# Patient Record
Sex: FEMALE | Race: OTHER | NOT HISPANIC OR LATINO
[De-identification: names, ages, dates, MRNs, and addresses within clinical notes are randomized per-mention and may not be internally consistent; named-entity substitution may affect disease eponyms.]

---

## 2021-06-25 ENCOUNTER — APPOINTMENT (OUTPATIENT)
Dept: OTOLARYNGOLOGY | Facility: CLINIC | Age: 61
End: 2021-06-25
Payer: COMMERCIAL

## 2021-06-25 VITALS — BODY MASS INDEX: 33.43 KG/M2 | TEMPERATURE: 96 F | HEIGHT: 66 IN | WEIGHT: 208 LBS

## 2021-06-25 DIAGNOSIS — M26.609 UNSPECIFIED TEMPOROMANDIBULAR JOINT DISORDER: ICD-10-CM

## 2021-06-25 DIAGNOSIS — H93.299 OTHER ABNORMAL AUDITORY PERCEPTIONS, UNSPECIFIED EAR: ICD-10-CM

## 2021-06-25 PROBLEM — Z00.00 ENCOUNTER FOR PREVENTIVE HEALTH EXAMINATION: Status: ACTIVE | Noted: 2021-06-25

## 2021-06-25 PROCEDURE — 99213 OFFICE O/P EST LOW 20 MIN: CPT | Mod: 25

## 2021-06-25 PROCEDURE — 92557 COMPREHENSIVE HEARING TEST: CPT

## 2021-06-25 PROCEDURE — 31231 NASAL ENDOSCOPY DX: CPT

## 2021-06-25 PROCEDURE — 92550 TYMPANOMETRY & REFLEX THRESH: CPT

## 2021-06-25 NOTE — PHYSICAL EXAM
[FreeTextEntry1] : \par The patient was alert and oriented and in no distress.\par Voice was clear.\par \par Face:\par The patient had no facial asymmetry or mass.\par The skin was unremarkable.\par \par Eyes:\par The pupils were equal round and reactive to light and accommodation.\par There was no significant nystagmus or disconjugate gaze noted.\par \par Nose: \par The external nose had no significant deformity.  There was no facial tenderness.  On anterior rhinoscopy, the nasal mucosa was clear.  The anterior septum was midline.  There were no visualized polyps purulence  or masses.\par \par Oral cavity:\par The oral mucosa was normal.\par The oral and base of tongue were clear and without mass.\par The gingival and buccal mucosa were moist and without lesions.\par The palate moved well.\par There was no cleft to the palate.\par There appeared to be good salivary flow.  \par There was no pus, erythema or mass in the oral cavity.\par \par \par Ears:\par The external ears were normal without deformity.\par The ear canals were clear.\par The tympanic membranes were intact and normal.\par \par Neck: \par The neck was symmetrical.\par The parotid and submandibular glands were normal without masses.\par The trachea was midline and there was no unusual crepitus.\par The thyroid was smooth and nontender and no masses were palpated.\par There was no significant cervical adenopathy.\par \par \par Neuro:\par Neurologically, the patient was awake, alert, and oriented to person, place and time. There were no obvious focal neurologic abnormalities.  Cranial nerves II through XII were grossly intact.\par \par \par \par \par \par  [de-identified] : Nasal endoscopy: \par CPT 49110\par Procedure Note:\par \par Endoscopy was done with Covid precautions and with video. All risks and benefits were discussed with the patient and consent obtained.\par \par Nasal endoscopy was done with topical anesthesia of Pontocaine and Afrin and a      nasal endoscope.\par Indication: Nasal congestion, rule out sinusitis.\par Procedure: The nasal cavity was anesthetized with topical Afrin and Pontocaine. An  endoscope was used and inserted into the nasal cavity.\par Attention was first paid to the anterior nasal cavity.\par Endocoscopy was performed to inspect the interior of the nasal cavity, the nasal septum,  the middle and superior meati, the inferior, middle and superior turbinates, and the spheno-ethmoidal  recesses, the nasopharynx and eustachian tube orifices bilaterally. \par All findings were normal except:\par Mucosa is slightly boggy with a moderate S-shaped deflection\par \par Flexible fiberoptic laryngoscopy: CPT 67341\par Indications: Dysphagia\par Procedure note:\par \par Flexible fiberoptic laryngoscopy was performed because of dysphagia and because of the patient's inability to tolerate adequate mirror examination.\par \par The nasal cavity was anesthetized with Pontocaine and Afrin.\par The flexible endoscope was placed into the patient's nasal cavity.\par The nasopharynx was without masses.\par The oropharynx, vallecula and base of tongue had no masses.\par The epiglottis, aryepiglottic folds and false vocal cords were normal.\par The pyriform sinuses were without mucosal lesions or pooling of secretions.  \par The laryngeal ventricles were without lesions.\par The visualized subglottis was without mass.\par The lateral and posterior pharyngeal walls were clear and symmetrical.\par The vocal folds were clear and mobile; they abducted and adducted normally.\par There was no interarytenoid mass or fullness.\par \par Endoscopy was done with Covid precautions and with video. All risks and benefits were discussed with the patient and consent obtained.\par \par There is a punctate hemorrhage in the right mid anterior one third vocal fold\par \par \par TMJ:\par She had bilateral crepitus, mild tenderness and oral signs of bruxism\par \par \par Audiometry:\par \par Comprehensive audiometry showed that both ears had normal hearing.  The tympanograms were type A and the otoacoustic emissions were intact bilaterally.\par The audiogram was reviewed with the patient.

## 2021-06-25 NOTE — REASON FOR VISIT
[Subsequent Evaluation] : a subsequent evaluation for [Ear Pain] : ear pain [FreeTextEntry2] : nasal congestion

## 2021-06-25 NOTE — HISTORY OF PRESENT ILLNESS
[de-identified] : KATHY HERNANDEZ is a 60 year old female who comes in complaining of Intermittent right-sided ear discomfort, worse when laying on her right associated with some right-sided nasal congestion.  She denies significant reflux.She has some left-sided ear congestion as well.

## 2021-06-25 NOTE — REVIEW OF SYSTEMS
[Ear Pain] : ear pain [Nasal Congestion] : nasal congestion [Negative] : Heme/Lymph [de-identified] : pressure [FreeTextEntry4] : neck pain

## 2021-06-25 NOTE — ASSESSMENT
[FreeTextEntry1] : It was my impression that she has a normal ear exam. 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

## 2021-06-25 NOTE — CONSULT LETTER
[FreeTextEntry2] : ELIZABET TELLO\par  [FreeTextEntry1] : \par \par Dear  Dr. HO TELLO,\par \par I had the pleasure of seeing your patient today.  \par Please see my note below.\par \par \par Thank you very much for allowing me to participate in the care of your patient.\par \par Sincerely,\par \par \par Ho Kendrick MD\par [facility]\par [facility]\par  [facility]\par \par

## 2022-10-17 ENCOUNTER — APPOINTMENT (OUTPATIENT)
Dept: OTOLARYNGOLOGY | Facility: CLINIC | Age: 62
End: 2022-10-17

## 2022-10-17 DIAGNOSIS — R07.0 PAIN IN THROAT: ICD-10-CM

## 2022-10-17 PROCEDURE — 99213 OFFICE O/P EST LOW 20 MIN: CPT | Mod: 25

## 2022-10-17 PROCEDURE — 31575 DIAGNOSTIC LARYNGOSCOPY: CPT

## 2022-10-17 NOTE — PROCEDURE
[de-identified] : PROCEDURE: Flexible laryngoscopy\par SURGEON: Dr. Cline\par INDICATIONS: He was unable to tolerate a mirror exam. Assess for laryngopharynx pharyngeal reflux. cough. head and neck mass. \par ANESTHESIA: The patient was placed in a sitting position.  Following application of the topical anesthetic and decongestant, exam was performed with a flexible scope.  The scope was passed along the right nasal floor to the nasopharynx.  It was then passed into the region of the middle meatus, middle turbinate, and sphenoethmoid region.  An identical procedure was performed on the left side.  The following findings were noted:\par \par The nasal musoca was healthy appearing and the septum was roughly midline. The middle meatus and sphenoethmoid recesses were clear bilaterally. The nasopharynx \par \par Nasopharynx: no masses, choanae patent, no adenoid tissue\par \par Base of tongue/vallecula: no masses or asymmetry\par Pharyngeal walls: symmetrical. No masses.\par Pyriform sinuses: no lesions or pooling of secretions.\par Epiglottis: normal. No edema or lesions.\par Aryepiglottic folds: normal. No lesions. \par Vocal cords: clear and mobile. No lesions. Airway patent.\par Arytenoids: no edema or erythema. \par Interarytenoid area: no edema, erythema or lesion.\par

## 2022-10-17 NOTE — HISTORY OF PRESENT ILLNESS
[de-identified] : Initial visit.\par Chief complaint is "lump in neck".\par \par She reports that she was recently at her plastic surgeon for what sounds like resection of scalp sebaceous cyst.  She pointed to a lesion in her left neck and the plastic surgeon suggested she come to an otolaryngologist for evaluation.\par \par She believes the lesion in question has been present for years.\par She does not complain of unexplained weight loss, hemoptysis, dysphagia or any other head neck complaints.

## 2022-10-17 NOTE — ASSESSMENT
[FreeTextEntry1] : Clinically what appears to be cervical lymphadenopathy in her left lateral neck.\par She will be sent for sonogram of her neck and seen in follow-up.

## 2022-10-18 ENCOUNTER — OUTPATIENT (OUTPATIENT)
Dept: OUTPATIENT SERVICES | Facility: HOSPITAL | Age: 62
LOS: 1 days | End: 2022-10-18
Payer: COMMERCIAL

## 2022-10-18 ENCOUNTER — APPOINTMENT (OUTPATIENT)
Dept: ULTRASOUND IMAGING | Facility: HOSPITAL | Age: 62
End: 2022-10-18

## 2022-10-18 PROCEDURE — 76536 US EXAM OF HEAD AND NECK: CPT

## 2022-10-18 PROCEDURE — 76536 US EXAM OF HEAD AND NECK: CPT | Mod: 26

## 2022-12-01 ENCOUNTER — APPOINTMENT (OUTPATIENT)
Dept: OTOLARYNGOLOGY | Facility: CLINIC | Age: 62
End: 2022-12-01

## 2022-12-01 VITALS — BODY MASS INDEX: 36.96 KG/M2 | HEIGHT: 66 IN | WEIGHT: 230 LBS | TEMPERATURE: 96.3 F

## 2022-12-01 DIAGNOSIS — Z80.9 FAMILY HISTORY OF MALIGNANT NEOPLASM, UNSPECIFIED: ICD-10-CM

## 2022-12-01 DIAGNOSIS — R09.82 POSTNASAL DRIP: ICD-10-CM

## 2022-12-01 DIAGNOSIS — G40.909 EPILEPSY, UNSPECIFIED, NOT INTRACTABLE, W/OUT STATUS EPILEPTICUS: ICD-10-CM

## 2022-12-01 DIAGNOSIS — J31.0 CHRONIC RHINITIS: ICD-10-CM

## 2022-12-01 DIAGNOSIS — Z87.891 PERSONAL HISTORY OF NICOTINE DEPENDENCE: ICD-10-CM

## 2022-12-01 PROCEDURE — 99213 OFFICE O/P EST LOW 20 MIN: CPT | Mod: 25

## 2022-12-01 PROCEDURE — 31231 NASAL ENDOSCOPY DX: CPT

## 2022-12-01 NOTE — ASSESSMENT
[FreeTextEntry1] : She has at least a 1 month history of nasal and throat symptoms.  She has had chronic rhinitis and postnasal drip.  Her symptoms did improve over the past week.  On exam, she has mild nasal mucosal edema and a deviated septum.  There is no obvious sinus infection or other abnormalities.\par \par She saw Dr. Cline in October for a possible neck mass.  Ultrasound showed bilateral benign-appearing cervical lymph nodes which were likely reactive.\par \par Plan\par -Findings and management options were discussed with the patient.\par -I recommended nasal saline irrigations, a trial of a nasal steroid spray, and antihistamine or decongestant as needed\par -Salt water gargles as needed.  Avoid alcohol based mouthwashes\par -She was given literature regarding postnasal drip.\par -Although she did not have significant reflux related laryngeal changes, she has had some mild symptoms.  I recommended reflux precautions.  She may also try OTC medication.\par -Since she is feeling better, we will see how she does.  If she has recurrent symptoms, we will discuss further work-up such as allergy evaluation and work-up for reflux.\par -I have asked her to follow-up with Dr. Cline for management of the cervical lymph nodes.\par -She should follow-up if she has persistent symptoms or any concerns

## 2022-12-01 NOTE — HISTORY OF PRESENT ILLNESS
[de-identified] : KATHY HERNANDEZ is a 62 year old patient here for at least a 1 month history of nasal congestion, phlegm in her throat, cough, and postnasal drip. She is well known to Dr. Kendrick.  She has had mild intermittent reflux.  She has no throat pain, voice change, or dysphagia.  She has been better for the past week.  She saw Dr. Cline in October for a possible neck mass.  She had an ultrasound which showed benign-appearing lymph nodes.  She denies history of allergies.  She does not smoke tobacco but does smoke marijuana occasionally.  She gargles with salt water.  She has not using a nasal steroid spray.

## 2022-12-27 ENCOUNTER — APPOINTMENT (OUTPATIENT)
Dept: OTOLARYNGOLOGY | Facility: CLINIC | Age: 62
End: 2022-12-27

## 2022-12-27 VITALS — TEMPERATURE: 96.2 F | WEIGHT: 230 LBS | BODY MASS INDEX: 36.96 KG/M2 | HEIGHT: 66 IN

## 2022-12-27 PROCEDURE — 99214 OFFICE O/P EST MOD 30 MIN: CPT

## 2022-12-27 RX ORDER — AZELASTINE HYDROCHLORIDE 137 UG/1
0.1 SPRAY, METERED NASAL
Qty: 30 | Refills: 0 | Status: ACTIVE | COMMUNITY
Start: 2022-12-09

## 2022-12-27 RX ORDER — NYSTATIN 100000 [USP'U]/ML
100000 SUSPENSION ORAL
Qty: 200 | Refills: 0 | Status: ACTIVE | COMMUNITY
Start: 2022-12-09

## 2022-12-27 RX ORDER — DOXYCYCLINE 100 MG/1
100 CAPSULE ORAL
Qty: 14 | Refills: 0 | Status: ACTIVE | COMMUNITY
Start: 2022-12-09

## 2022-12-27 RX ORDER — FLUTICASONE PROPIONATE 50 UG/1
50 SPRAY, METERED NASAL
Qty: 16 | Refills: 0 | Status: ACTIVE | COMMUNITY
Start: 2022-12-09

## 2022-12-27 RX ORDER — TOBRAMYCIN AND DEXAMETHASONE 3; 1 MG/ML; MG/ML
0.3-0.1 SUSPENSION/ DROPS OPHTHALMIC
Qty: 10 | Refills: 0 | Status: ACTIVE | COMMUNITY
Start: 2022-12-12

## 2022-12-27 NOTE — ASSESSMENT
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 Brennan and I wonder if with her tongue concerns and lymphadenopathy whether she is a candidate for further autoimmune work-up.

## 2022-12-27 NOTE — HISTORY OF PRESENT ILLNESS
[de-identified] : She comes in follow up today.\par She had a complaint of posterior neck node.  She was sent for sonogram.  This demonstrated multiple bilateral lymph nodes.  None of them concerning criteria for size or abnormal morphology.\par She does not have any constitutional symptoms.\par \par Her only complaint today is some vague tongue dryness and intermittent discomfort.

## 2022-12-27 NOTE — CONSULT LETTER
[Dear  ___] : Dear  [unfilled], [Courtesy Letter:] : I had the pleasure of seeing your patient, [unfilled], in my office today. [Please see my note below.] : Please see my note below. [Sincerely,] : Sincerely, [FreeTextEntry3] : Elijah Cline MD\par [facility]- Co-Director\par [facility] Physician [facility]

## 2023-03-30 ENCOUNTER — APPOINTMENT (OUTPATIENT)
Dept: COLORECTAL SURGERY | Facility: CLINIC | Age: 63
End: 2023-03-30
Payer: COMMERCIAL

## 2023-03-30 ENCOUNTER — NON-APPOINTMENT (OUTPATIENT)
Age: 63
End: 2023-03-30

## 2023-03-30 VITALS
TEMPERATURE: 98.5 F | WEIGHT: 238 LBS | BODY MASS INDEX: 38.25 KG/M2 | DIASTOLIC BLOOD PRESSURE: 84 MMHG | SYSTOLIC BLOOD PRESSURE: 152 MMHG | HEART RATE: 68 BPM | HEIGHT: 66 IN

## 2023-03-30 DIAGNOSIS — Z83.3 FAMILY HISTORY OF DIABETES MELLITUS: ICD-10-CM

## 2023-03-30 DIAGNOSIS — Z78.0 ASYMPTOMATIC MENOPAUSAL STATE: ICD-10-CM

## 2023-03-30 DIAGNOSIS — Z82.49 FAMILY HISTORY OF ISCHEMIC HEART DISEASE AND OTHER DISEASES OF THE CIRCULATORY SYSTEM: ICD-10-CM

## 2023-03-30 DIAGNOSIS — I10 ESSENTIAL (PRIMARY) HYPERTENSION: ICD-10-CM

## 2023-03-30 PROCEDURE — 99203 OFFICE O/P NEW LOW 30 MIN: CPT | Mod: 25

## 2023-03-30 PROCEDURE — 46600 DIAGNOSTIC ANOSCOPY SPX: CPT

## 2023-03-30 NOTE — ASSESSMENT
[FreeTextEntry1] : Exam findings and diagnosis were discussed at length with patient. \par Management options discussed, including supportive care. Witch hazel and hydrocortisone as needed for symptoms were discussed.\par \par We discussed surgical hemorrhoidectomy for the large external hemorrhoid seen on exam. \par The nature of the procedure as well as risks and benefits were reviewed with the patient. Risk/benefits/alternatives discussed at length, including but not limited to pain, bleeding, infection, swelling, recurrence of symptoms, need for future surgery, scarring, and risk of alteration of bowel habits, which may be temporary or permanent. Postprocedural expectations regarding pain, wound cosmesis, and wound healing was discussed.  The preoperative, intraoperative, and postoperative management were discussed with the patient in detail. All questions were answered, patient expressed understanding, and would like to proceed with the proposed surgery. Informed consent was obtained. Ambulatory surgery instructions were given to patient.\par

## 2023-03-30 NOTE — HISTORY OF PRESENT ILLNESS
[FreeTextEntry1] : 61 yo F presents for evaluation of hemorrhoids, referred by GI Dr. Sun\par PMH HTN, menopause, kidney stones, h/o epilepsy, BRCA 1 positive (father's side)\par h/o cigarette use x 18 years, quit 1999\par PSH prophylactic oophorectomy, breast cyst excision, kidney stone surgery requiring stents\par Distant FMH of CRC (paternal great aunt dx age < 60), Mother w/ Breast CA and then uterine CA, then recurrent breast CA x 2.\par Father prostate CA and Multiple Myeloma, sister w/ stage IV ovarian CA and breast CA (dx age 40)\par \par Last colonoscopy 12/3/2020 w/ Dr. John Sanchez at [facility] which was negative per pt. Mild internal hemorrhoids and single diverticulum in ascending colon per report\par EGD 2021 multiple gastric polyps, erythema in antrum and duodenum\par \par \par h/o hemorrhoids for years which would occasionally be uncomfortable and has had occasional bleeding noted on TP and will last a few days then resolve on its own. Lately has been occurring with every BM and has some burning which resolves after shower. \par Admits to external tissue present that swells and goes down. \par Has tried epson salt baths. Doesn't like to use medications so hasn't tried any.\par \par Admits to doing aerobic exercise daily, no issues w/ BMs, rare straining.\par Reports adequate intake of dietary fiber and 3 L water\par Denies use of stool softeners or fiber supplements\par \par \par Denies ASA/NSAIDs in last 7 days\par

## 2023-03-30 NOTE — PHYSICAL EXAM
[FreeTextEntry1] : Medical assistant present for duration of physical examination\par \par General no acute distress, alert and oriented\par Psych calm, pleasant demeanor, responding appropriately to questions\par Nonlabored breathing\par Ambulating without assistance\par Skin normal color and pigment, no visible lesions or rashes\par \par Anorectal Exam:\par Inspection no erythema, induration or fluctuance, no skin excoriation, no fissure, large right sided external hemorrhoid without thrombosis, smaller other external hemorrhoids also present\par MAITE nontender, no masses palpated, no blood on gloved finger\par \par Procedure: Anoscopy\par \par Pre procedure Diagnosis: external hemorrhoid\par Post procedure Diagnosis: external hemorrhoid\par Anesthesia: none\par Estimated blood loss: none\par Specimen: none\par Complications: none\par \par Consent obtained. Anoscopy was performed by passing a lighted anoscope with lubricant jelly into the anal canal and the entire anal mucosal surface was inspected. Findings included no fissure, mild internal hemorrhoids\par \par Patient tolerated examination and procedure well.\par \par \par

## 2023-04-04 ENCOUNTER — APPOINTMENT (OUTPATIENT)
Dept: OTOLARYNGOLOGY | Facility: CLINIC | Age: 63
End: 2023-04-04
Payer: COMMERCIAL

## 2023-04-04 VITALS — BODY MASS INDEX: 36.64 KG/M2 | HEIGHT: 66 IN | WEIGHT: 228 LBS

## 2023-04-04 PROCEDURE — 99213 OFFICE O/P EST LOW 20 MIN: CPT

## 2023-04-05 NOTE — HISTORY OF PRESENT ILLNESS
[de-identified] : She comes in follow up today.\par She had a complaint of posterior neck node.  She was sent for sonogram.  This demonstrated multiple bilateral lymph nodes.  None of them concerning criteria for size or abnormal morphology.\par She does not have any constitutional symptoms.\par \par Her only complaint today is some vague tongue dryness and intermittent discomfort. [FreeTextEntry1] : \par \par 04/04/2023 \par \par comes in for surveillance of idiopathic lymphadenopathy.\par No constitutional symptoms.\par She reports that her PCP, rheumatologist did a serologic work up that is negative.\par No head and neck complaints.

## 2023-04-05 NOTE — END OF VISIT
Message states mailbox is full   [Time Spent: ___ minutes] : I have spent [unfilled] minutes of time on the encounter.

## 2023-04-05 NOTE — ASSESSMENT
[FreeTextEntry1] : Clinically stable.\par fu 6 months or prior if develop symptoms of head and neck and constitutional sxs.

## 2023-04-18 DIAGNOSIS — Z01.818 ENCOUNTER FOR OTHER PREPROCEDURAL EXAMINATION: ICD-10-CM

## 2023-04-20 ENCOUNTER — TRANSCRIPTION ENCOUNTER (OUTPATIENT)
Age: 63
End: 2023-04-20

## 2023-04-20 NOTE — ASU PATIENT PROFILE, ADULT - FALL HARM RISK - UNIVERSAL INTERVENTIONS
Bed in lowest position, wheels locked, appropriate side rails in place/Call bell, personal items and telephone in reach/Instruct patient to call for assistance before getting out of bed or chair/Non-slip footwear when patient is out of bed/[place] to call system/Physically safe environment - no spills, clutter or unnecessary equipment/Purposeful Proactive Rounding/Room/bathroom lighting operational, light cord in reach

## 2023-04-20 NOTE — ASU PATIENT PROFILE, ADULT - NS PREOP UNDERSTANDS INFO
bowel prep per MD orders, clears allowed up to 10am, bring ID and insurance card, no valuables or jewelry, wear comfortable clothing, no drinking or smoking today/yes

## 2023-04-20 NOTE — ASU PATIENT PROFILE, ADULT - NSICDXPASTSURGICALHX_GEN_ALL_CORE_FT
PAST SURGICAL HISTORY:  H/O breast surgery removal of cyst fron left    H/O myomectomy     History of kidney surgery kidney stones    S/P bilateral oophorectomy

## 2023-04-21 ENCOUNTER — TRANSCRIPTION ENCOUNTER (OUTPATIENT)
Age: 63
End: 2023-04-21

## 2023-04-21 ENCOUNTER — OUTPATIENT (OUTPATIENT)
Dept: OUTPATIENT SERVICES | Facility: HOSPITAL | Age: 63
LOS: 1 days | Discharge: HOME | End: 2023-04-21
Payer: COMMERCIAL

## 2023-04-21 ENCOUNTER — APPOINTMENT (OUTPATIENT)
Dept: COLORECTAL SURGERY | Facility: CLINIC | Age: 63
End: 2023-04-21

## 2023-04-21 ENCOUNTER — RESULT REVIEW (OUTPATIENT)
Age: 63
End: 2023-04-21

## 2023-04-21 VITALS
SYSTOLIC BLOOD PRESSURE: 128 MMHG | DIASTOLIC BLOOD PRESSURE: 78 MMHG | OXYGEN SATURATION: 97 % | TEMPERATURE: 98 F | HEART RATE: 72 BPM | RESPIRATION RATE: 14 BRPM

## 2023-04-21 VITALS
WEIGHT: 210.76 LBS | DIASTOLIC BLOOD PRESSURE: 76 MMHG | HEART RATE: 71 BPM | OXYGEN SATURATION: 100 % | RESPIRATION RATE: 15 BRPM | SYSTOLIC BLOOD PRESSURE: 115 MMHG | TEMPERATURE: 98 F | HEIGHT: 66 IN

## 2023-04-21 DIAGNOSIS — Z90.722 ACQUIRED ABSENCE OF OVARIES, BILATERAL: Chronic | ICD-10-CM

## 2023-04-21 DIAGNOSIS — Z98.890 OTHER SPECIFIED POSTPROCEDURAL STATES: Chronic | ICD-10-CM

## 2023-04-21 DIAGNOSIS — G89.18 OTHER ACUTE POSTPROCEDURAL PAIN: ICD-10-CM

## 2023-04-21 PROCEDURE — 88304 TISSUE EXAM BY PATHOLOGIST: CPT | Mod: 26

## 2023-04-21 PROCEDURE — 46255 REMOVE INT/EXT HEM 1 GROUP: CPT | Mod: GC

## 2023-04-21 DEVICE — SURGICEL 4 X 8": Type: IMPLANTABLE DEVICE | Site: ANUS | Status: FUNCTIONAL

## 2023-04-21 RX ORDER — FENTANYL CITRATE 50 UG/ML
25 INJECTION INTRAVENOUS
Refills: 0 | Status: DISCONTINUED | OUTPATIENT
Start: 2023-04-21 | End: 2023-04-21

## 2023-04-21 RX ORDER — OXYCODONE AND ACETAMINOPHEN 5; 325 MG/1; MG/1
5-325 TABLET ORAL
Qty: 20 | Refills: 0 | Status: ACTIVE | COMMUNITY
Start: 2023-04-21 | End: 1900-01-01

## 2023-04-21 RX ORDER — ACETAMINOPHEN 500 MG
1000 TABLET ORAL ONCE
Refills: 0 | Status: DISCONTINUED | OUTPATIENT
Start: 2023-04-21 | End: 2023-04-21

## 2023-04-21 RX ORDER — DOCUSATE SODIUM 100 MG/1
100 CAPSULE ORAL 3 TIMES DAILY
Qty: 90 | Refills: 1 | Status: ACTIVE | COMMUNITY
Start: 2023-04-21 | End: 1900-01-01

## 2023-04-21 RX ORDER — SODIUM CHLORIDE 9 MG/ML
1000 INJECTION, SOLUTION INTRAVENOUS
Refills: 0 | Status: DISCONTINUED | OUTPATIENT
Start: 2023-04-21 | End: 2023-04-21

## 2023-04-21 NOTE — ASU DISCHARGE PLAN (ADULT/PEDIATRIC) - CARE PROVIDER_API CALL
Betty Carmen)  ColonRectal Surgery; Surgery  [address], [address]  [place], [place] [address]  Phone: [phone]  Fax: [phone]  Follow Up Time: 1 month

## 2023-04-21 NOTE — BRIEF OPERATIVE NOTE - NSICDXBRIEFPROCEDURE_GEN_ALL_CORE_FT
PROCEDURES:  Simple hemorrhoidectomy, internal and external 21-Apr-2023 14:59:15 RA external and internal hemorrhoidectomy Neil Hernandez

## 2023-04-21 NOTE — ASU DISCHARGE PLAN (ADULT/PEDIATRIC) - ASU DC SPECIAL INSTRUCTIONSFT
Please follow up with Dr. Carmen in 1 week, please call to schedule an appointment.   Please follow instructions given to you by Dr. Carmen and her office.    Activity: Please resume normal activities such as walking, climbing stairs, and other activities of daily living. Refrain from intense exercise for at least 1 week.     Diet: please continue with high fiber diet.     Bleeding: Expect some bleeding after surgery. Spotting, dripping into toilet bowl, staining on your dressing - these are all expected up to a week or two after surgery.     Dressings: please keep in place for 1 day or until after your first bowel movement, whichever comes first. A piece of surgicell (cotton strip) has been placed within the anus, it will dissolve on its own and helps stop postoperative bleeding.     Sitz baths: please continue with sitz baths in a warm tub to help with pain and discomfort. Each soak should be for 15-20 minutes in a warm bath. You may repeat as many times as necessary.      Bowel movements: Please take colace 100mg twice daily for 2 weeks to facilitate solid, consistent bowel movements.     Medications: Please resume home medications. Please continue to hold blood thinners for 48 hours after the surgery (ex: aspirin, plavix), you may resume after 2 days. Do not place anything in the anus.     New medications:  You have been sent the following prescriptions to your pharmacy:  1. Percocet 325mg-5mg every 6 hours as needed for severe pain.   2. Colace 100mg - please take 1-2 times daily while taking percocet to prevent constipation.     General Discharge Instructions:  Please resume all regular home medications unless specifically advised not to take a particular medication. Also, please take any new medications as prescribed.  Please get plenty of rest, continue to ambulate several times per day, and drink adequate amounts of fluids. Avoid lifting weights greater than 5-10 lbs until you follow-up with your surgeon, who will instruct you further regarding activity restrictions.  Avoid driving or operating heavy machinery while taking pain medications.  Please follow-up with your surgeon and Primary Care Provider (PCP) as advised.    Incision Care:  *Please call your doctor or nurse practitioner if you have increased pain, swelling, redness, or drainage from the incision site.  *Avoid swimming and baths until your follow-up appointment.  *You may shower, and wash surgical incisions with a mild soap and warm water. Gently pat the area dry.    Warning Signs:  Please call your doctor or nurse practitioner if you experience the following:  *You experience new chest pain, pressure, squeezing or tightness.  *New or worsening cough, shortness of breath, or wheeze.  *If you are vomiting and cannot keep down fluids or your medications.  *You are getting dehydrated due to continued vomiting, diarrhea, or other reasons. Signs of dehydration include dry mouth, rapid heartbeat, or feeling dizzy or faint when standing.  *You see blood or dark/black material when you vomit or have a bowel movement.  *You experience burning when you urinate, have blood in your urine, or experience a discharge.  *Your pain is not improving within 8-12 hours or is not gone within 24 hours. Call or return immediately if your pain is getting worse, changes location, or moves to your chest or back.  *You have shaking chills, or fever greater than 101.5 degrees Fahrenheit or 38 degrees Celsius.  *Any change in your symptoms, or any new symptoms that concern you. Please follow up with Dr. Carmen in 3-4 weeks, please call to schedule an appointment.   Please follow instructions given to you by Dr. Carmen and her office.    Activity: Please resume normal activities such as walking, climbing stairs, and other activities of daily living. Refrain from intense exercise for at least 1 week.     Diet: please continue with high fiber diet.     Bleeding: Expect some bleeding after surgery. Spotting, dripping into toilet bowl, staining on your dressing - these are all expected up to a week or two after surgery.     Dressings: please keep in place for 1 day or until after your first bowel movement, whichever comes first. A piece of surgicell (cotton strip) has been placed within the anus, it will dissolve on its own and helps stop postoperative bleeding.     Sitz baths: please continue with sitz baths in a warm tub to help with pain and discomfort. Each soak should be for 15-20 minutes in a warm bath. You may repeat as many times as necessary.      Bowel movements: Please take colace 100mg twice daily for 2 weeks to facilitate solid, consistent bowel movements.     Medications: Please resume home medications. Please continue to hold blood thinners for 48 hours after the surgery (ex: aspirin, plavix), you may resume after 2 days. Do not place anything in the anus.     New medications:  You have been sent the following prescriptions to your pharmacy:  1. Percocet 325mg-5mg every 6 hours as needed for severe pain.   2. Colace 100mg - please take 1-2 times daily while taking percocet to prevent constipation.     General Discharge Instructions:  Please resume all regular home medications unless specifically advised not to take a particular medication. Also, please take any new medications as prescribed.  Please get plenty of rest, continue to ambulate several times per day, and drink adequate amounts of fluids. Avoid lifting weights greater than 5-10 lbs until you follow-up with your surgeon, who will instruct you further regarding activity restrictions.  Avoid driving or operating heavy machinery while taking pain medications.  Please follow-up with your surgeon and Primary Care Provider (PCP) as advised.    Incision Care:  *Please call your doctor or nurse practitioner if you have increased pain, swelling, redness, or drainage from the incision site.  *Avoid swimming and baths until your follow-up appointment.  *You may shower, and wash surgical incisions with a mild soap and warm water. Gently pat the area dry.    Warning Signs:  Please call your doctor or nurse practitioner if you experience the following:  *You experience new chest pain, pressure, squeezing or tightness.  *New or worsening cough, shortness of breath, or wheeze.  *If you are vomiting and cannot keep down fluids or your medications.  *You are getting dehydrated due to continued vomiting, diarrhea, or other reasons. Signs of dehydration include dry mouth, rapid heartbeat, or feeling dizzy or faint when standing.  *You see blood or dark/black material when you vomit or have a bowel movement.  *You experience burning when you urinate, have blood in your urine, or experience a discharge.  *Your pain is not improving within 8-12 hours or is not gone within 24 hours. Call or return immediately if your pain is getting worse, changes location, or moves to your chest or back.  *You have shaking chills, or fever greater than 101.5 degrees Fahrenheit or 38 degrees Celsius.  *Any change in your symptoms, or any new symptoms that concern you.

## 2023-04-21 NOTE — PRE-ANESTHESIA EVALUATION ADULT - NSANTHOSAYNRD_GEN_A_CORE
No. ALEAXNDRIA screening performed.  STOP BANG Legend: 0-2 = LOW Risk; 3-4 = INTERMEDIATE Risk; 5-8 = HIGH Risk

## 2023-04-21 NOTE — BRIEF OPERATIVE NOTE - OPERATION/FINDINGS
Patient placed in prone jackknife position. Prepped and draped in sterile fashion. Rectal exam performed, noting large right anterior external hemorrhoid, with internal component. Suture ligation performed at the base of the hemorrhoidal pedicle. Hemorrhoid excised via sharp dissection with scalpel and scissors. Hemorrhoidal bed was closed in interrupted fashion using suture. Adequate hemostasis was achieved. Surgicell placed in anal canal, with 4x4 gauze in the gluteal cleft, with overlying 4x8 gauze and abdominal pads.

## 2023-04-21 NOTE — BRIEF OPERATIVE NOTE - NSICDXBRIEFPREOP_GEN_ALL_CORE_FT
PRE-OP DIAGNOSIS:  Hemorrhoids 21-Apr-2023 14:59:42 Right anterior external and internal pathologic hemorrhoid Neil Hernandez

## 2023-04-21 NOTE — BRIEF OPERATIVE NOTE - NSICDXBRIEFPOSTOP_GEN_ALL_CORE_FT
POST-OP DIAGNOSIS:  Hemorrhoids 21-Apr-2023 15:00:33 Right anterior external and internal pathologic hemorrhoid Neil Hernandez

## 2023-04-24 PROBLEM — Z87.898 PERSONAL HISTORY OF OTHER SPECIFIED CONDITIONS: Chronic | Status: ACTIVE | Noted: 2023-04-20

## 2023-04-25 PROBLEM — N20.0 CALCULUS OF KIDNEY: Chronic | Status: ACTIVE | Noted: 2023-04-20

## 2023-05-05 LAB — SURGICAL PATHOLOGY STUDY: SIGNIFICANT CHANGE UP

## 2023-05-18 ENCOUNTER — APPOINTMENT (OUTPATIENT)
Dept: COLORECTAL SURGERY | Facility: CLINIC | Age: 63
End: 2023-05-18
Payer: COMMERCIAL

## 2023-05-18 VITALS
WEIGHT: 221 LBS | TEMPERATURE: 97.2 F | BODY MASS INDEX: 35.52 KG/M2 | HEART RATE: 89 BPM | HEIGHT: 66 IN | DIASTOLIC BLOOD PRESSURE: 93 MMHG | SYSTOLIC BLOOD PRESSURE: 155 MMHG

## 2023-05-18 DIAGNOSIS — K64.9 UNSPECIFIED HEMORRHOIDS: ICD-10-CM

## 2023-05-18 PROCEDURE — 99024 POSTOP FOLLOW-UP VISIT: CPT

## 2023-05-18 NOTE — HISTORY OF PRESENT ILLNESS
[FreeTextEntry1] : 61yo female presents for follow up\par \par PMH HTN, menopause, kidney stones, h/o epilepsy, BRCA 1 positive (father's side)\par h/o cigarette use x 18 years, quit 1999\par PSH prophylactic oophorectomy, breast cyst excision, kidney stone surgery requiring stents\par Distant FMH of CRC (paternal great aunt dx age < 60), Mother w/ Breast CA and then uterine CA, then recurrent breast CA x 2.\par Father prostate CA and Multiple Myeloma, sister w/ stage IV ovarian CA and breast CA (dx age 40)\par \par Last colonoscopy 12/3/2020 w/ Dr. John Sanchez at [facility] [place], [place] which was negative per pt. Mild internal hemorrhoids and single diverticulum in ascending colon per report\par EGD 2021 multiple gastric polyps, erythema in antrum and duodenum\par \par Seen for initial evaluation of hemorrhoids 3/30/23\par Exam including anoscopy noted large right sided external hemorrhoid without thrombosis, smaller other external hemorrhoids also present, mild internal hemorrhoids\par \par s/p right anterior hemorrhoidectomy 4/21/23\par \par Surgical Pathology Report - Auth (Verified)\par Specimen(s) Submitted\par 1  Right anterior hemorrhoid\par Final Diagnosis\par 1.  Submitted as right anterior hemorrhoid:\par -   Hemorrhoid\par Verified by: Lincoln Penaloza M.D.\par \par Presents today for first postop visit. \par Had pain initially post op, managed without pain meds. Took Tylenol the first night after surgery, nothing since. Never took percocet. \par BMs in early postop were thinner, now back to a more normal consistency.\par Eating a lot of fiber. \par Not seeing BRB with BMs anymore. \par Doing sitz baths daily. \par

## 2023-05-18 NOTE — PHYSICAL EXAM
[FreeTextEntry1] : Medical assistant present for duration of physical examination\par \par General no acute distress, alert and oriented\par Psych calm, pleasant, in good spirits\par Nonlabored breathing\par Ambulating without assistance\par Skin normal color and pigment\par \par Anorectal Exam:\par Inspection no cellulitis or skin changes, no fissure, well healed right anterior hemorrhoidectomy site, no visible sutures, small residual external hemorrhoids remain\par MAITE nontender, no masses palpated, no blood on gloved finger\par \par Procedure: Anoscopy\par \par Pre procedure Diagnosis: s/p right anterior hemorrhoidectomy \par Post procedure Diagnosis: s/p right anterior hemorrhoidectomy \par Anesthesia: none\par Estimated blood loss: none\par Specimen: none\par Complications: none\par \par Consent obtained. Anoscopy was performed by passing a lighted anoscope with lubricant jelly into the anal canal and the entire anal mucosal surface was inspected. Findings included no fissure, superficial appearing wound right anterior, no sutures visible, no blood or discharge in anal canal\par \par Patient tolerated examination and procedure well.\par \par \par

## 2023-05-18 NOTE — ASSESSMENT
[FreeTextEntry1] : Recovering well, patient reassured\par Continue high fiber diet, adequate oral hydration, and sitz baths as needed\par Avoid constipation and straining\par F/u as needed.\par All questions were answered, patient expressed understanding, and is agreeable to this plan.\par

## 2023-10-05 ENCOUNTER — APPOINTMENT (OUTPATIENT)
Dept: OTOLARYNGOLOGY | Facility: CLINIC | Age: 63
End: 2023-10-05
Payer: COMMERCIAL

## 2023-10-05 PROCEDURE — 99213 OFFICE O/P EST LOW 20 MIN: CPT

## 2024-04-04 ENCOUNTER — APPOINTMENT (OUTPATIENT)
Dept: OTOLARYNGOLOGY | Facility: CLINIC | Age: 64
End: 2024-04-04
Payer: COMMERCIAL

## 2024-04-04 ENCOUNTER — APPOINTMENT (OUTPATIENT)
Dept: OTOLARYNGOLOGY | Facility: CLINIC | Age: 64
End: 2024-04-04

## 2024-04-04 DIAGNOSIS — R59.0 LOCALIZED ENLARGED LYMPH NODES: ICD-10-CM

## 2024-04-04 PROCEDURE — 99213 OFFICE O/P EST LOW 20 MIN: CPT

## 2024-04-04 NOTE — HISTORY OF PRESENT ILLNESS
[de-identified] : Well-known to me.  History of benign cervical lymphadenopathy.  Comes in for surveillance.  Has no constitutional symptoms.  Has no head neck complaints.

## 2024-04-04 NOTE — ASSESSMENT
[FreeTextEntry1] : Clinically stable.  No evidence of lymphadenopathy. I am going to discharge her at this time.  Follow-up on an as-needed basis.

## (undated) DEVICE — SLV COMPRESSION KNEE MED

## (undated) DEVICE — PACK COLO RECTAL

## (undated) DEVICE — GLV 6.5 PROTEXIS (WHITE)

## (undated) DEVICE — ELCTR BOVIE PENCIL SMOKE EVACUATION

## (undated) DEVICE — WARMING BLANKET UPPER ADULT

## (undated) DEVICE — GLV 7 PROTEXIS (WHITE)

## (undated) DEVICE — SUT VICRYL 3-0 18" SH UNDYED (POP-OFF)